# Patient Record
Sex: FEMALE | Race: BLACK OR AFRICAN AMERICAN | NOT HISPANIC OR LATINO | Employment: OTHER | URBAN - METROPOLITAN AREA
[De-identification: names, ages, dates, MRNs, and addresses within clinical notes are randomized per-mention and may not be internally consistent; named-entity substitution may affect disease eponyms.]

---

## 2024-01-26 PROBLEM — K31.89 SUBMUCOSAL LESION OF STOMACH: Status: ACTIVE | Noted: 2024-01-26

## 2024-02-20 ASSESSMENT — ENCOUNTER SYMPTOMS
ABDOMINAL PAIN: 0
HEADACHES: 1
STRIDOR: 0
SWOLLEN GLANDS: 1
COUGH: 0
VOMITING: 1
SORE THROAT: 1
NECK PAIN: 1
HOARSE VOICE: 0
DIARRHEA: 0
TROUBLE SWALLOWING: 1

## 2024-02-27 ENCOUNTER — OFFICE VISIT (OUTPATIENT)
Dept: OTOLARYNGOLOGY | Facility: CLINIC | Age: 64
End: 2024-02-27
Payer: COMMERCIAL

## 2024-02-27 VITALS — HEIGHT: 63 IN | WEIGHT: 193 LBS | BODY MASS INDEX: 34.2 KG/M2

## 2024-02-27 DIAGNOSIS — R22.1 NECK FULLNESS: ICD-10-CM

## 2024-02-27 DIAGNOSIS — R09.89 THROAT CLEARING: ICD-10-CM

## 2024-02-27 DIAGNOSIS — J35.1 TONSILLAR HYPERTROPHY: ICD-10-CM

## 2024-02-27 DIAGNOSIS — M54.2 NECK PAIN: ICD-10-CM

## 2024-02-27 DIAGNOSIS — E04.9 GOITER: ICD-10-CM

## 2024-02-27 DIAGNOSIS — K21.9 LARYNGOPHARYNGEAL REFLUX (LPR): ICD-10-CM

## 2024-02-27 DIAGNOSIS — M54.2 NECK DISCOMFORT: Primary | ICD-10-CM

## 2024-02-27 PROCEDURE — 1036F TOBACCO NON-USER: CPT | Performed by: STUDENT IN AN ORGANIZED HEALTH CARE EDUCATION/TRAINING PROGRAM

## 2024-02-27 PROCEDURE — 99204 OFFICE O/P NEW MOD 45 MIN: CPT | Performed by: STUDENT IN AN ORGANIZED HEALTH CARE EDUCATION/TRAINING PROGRAM

## 2024-02-27 PROCEDURE — 31575 DIAGNOSTIC LARYNGOSCOPY: CPT | Performed by: STUDENT IN AN ORGANIZED HEALTH CARE EDUCATION/TRAINING PROGRAM

## 2024-02-27 PROCEDURE — 1159F MED LIST DOCD IN RCRD: CPT | Performed by: STUDENT IN AN ORGANIZED HEALTH CARE EDUCATION/TRAINING PROGRAM

## 2024-02-27 RX ORDER — EPINEPHRINE 0.3 MG/.3ML
0.3 INJECTION SUBCUTANEOUS ONCE AS NEEDED
COMMUNITY
Start: 2016-05-03

## 2024-02-27 RX ORDER — PANTOPRAZOLE SODIUM 40 MG/1
40 TABLET, DELAYED RELEASE ORAL
Qty: 90 TABLET | Refills: 1 | Status: SHIPPED | OUTPATIENT
Start: 2024-02-27 | End: 2024-08-25

## 2024-02-27 RX ORDER — BIOTIN 800 MCG
TABLET ORAL
COMMUNITY

## 2024-02-27 RX ORDER — HYDROCORTISONE 5 MG/1
5 TABLET ORAL DAILY
COMMUNITY
Start: 2018-03-19

## 2024-02-27 RX ORDER — ALBUTEROL SULFATE 90 UG/1
2 AEROSOL, METERED RESPIRATORY (INHALATION)
COMMUNITY
Start: 2023-09-11

## 2024-02-27 RX ORDER — CIPROFLOXACIN HYDROCHLORIDE 3 MG/ML
1 SOLUTION/ DROPS OPHTHALMIC 4 TIMES DAILY
COMMUNITY
Start: 2023-07-10

## 2024-02-27 RX ORDER — CYANOCOBALAMIN/FOLIC ACID 1MG-400MCG
LOZENGE SUBLINGUAL
COMMUNITY
Start: 2023-01-01

## 2024-02-27 RX ORDER — LEVOTHYROXINE SODIUM 100 UG/1
100 TABLET ORAL
COMMUNITY
Start: 2023-09-20

## 2024-02-27 RX ORDER — PYRIDOXINE HCL (VITAMIN B6) 100 MG
100 TABLET ORAL DAILY
COMMUNITY
Start: 2023-01-01

## 2024-02-27 RX ORDER — CHOLECALCIFEROL (VITAMIN D3) 125 MCG
5000 CAPSULE ORAL
COMMUNITY

## 2024-02-27 RX ORDER — FLUTICASONE PROPIONATE 50 MCG
2 SPRAY, SUSPENSION (ML) NASAL
COMMUNITY
Start: 2023-09-01 | End: 2024-08-31

## 2024-02-27 NOTE — PATIENT INSTRUCTIONS
"REFLUX (GERD / LPR) INFORMATION SHEET    TYPES OF REFLUX:  *  Gastroesophageal Reflux (GERD):  Backflow of stomach contents (acid) into the esophagus     ·  Symptoms:  Heartburn, regurgitation, swallowing problems  *  Laryngopharyngeal (LPR):  Backflow of stomach contents into the voice box and throat     ·  Symptoms:  Hoarseness, nonproductive throat clearing, cough, swallowing problems, sensation of \"lump\" in the throat, sudden shortness of breath or choking sensation         (especially at night)      WHY DON'T I HAVE HEARTBURN?  Many patients with LPRD do not experience significant heartburn (65%).  Heartburn occurs when the tissue in the esophagus becomes irritated.  The lining in the larynx (voice box) and the upper throat does not have as strong a protective lining as the esophagus, therefore, it is more sensitive to stomach acid.     WHAT CAN I DO TO REDUCE REFLUX?  *   Reduce Stress:  Even a moderate amount of stress can dramatically increase the amount of reflux.    *   Diet:  Try to maintain a healthy body weight.  Eat sensible, moderate amounts.  Eat meals at least 3 hours before bedtime. Avoid bedtime snacks.  Certain foods have been         shown to cause reflux including:     ·  Spicy, acidic and greasy foods     ·  Tomato based foods (spaghetti sauce, Mexican foods, etc.)     ·  Acidic fruit and juices (orange, tomato, cranberry juice, grapefruit, etc.)     ·  Caffeine (tea, coffee, soda, chocolate)     ·  Alcohol     ·  Dairy products     ·  Peppermint    *   Bedtime:  Elevate the head of your bed with 4-6 inch blocks.  Do not elevate your head with extra pillows as this can worsen reflux.  If the entire head of the bed is tilted        upwards, gravity reduces the backflow of acid.   *   Clothing:  Avoid tight belts and other restrictive clothing  *   Smoking:  Avoid smoking and second-hand smoke as this dramatically increases reflux   *   Medications:  NSAIDS (ex. Advil/Motrin type meds), aspirin, " "steroids can aggravate reflux  *   Medical Therapy:     ·  H2 receptor antagonists - cimetidine, ranitidine, famotidine, etc.            o Absorption is reduced 10 to 20 percent by associated antacid administration (ex. Tums)            o Achieve less acid suppression than proton pump inhibitors     ·  Proton pump inhibitor - omeprazole, lansoprazole, pantoprazole, etc.            o PPI's most effective when taken 30 to 60 minutes before meals on an empty stomach            o PPI's can effect absorption of medications (Plavix, Coumadin, etc) so check with your pharmacist prior to initiating therapy for any interactions      ·  Alginates - Gaviscon and Gaviscon Advance              o Block the stomach lining by forming a physical barrier    Once on medical therapy for reflux it can take weeks or months for symptom improvement or resolution as underlying damaged tissue heals (think about a burn on your arm…will take a long time to completely heal).  Persistent symptoms despite appropriate, consistent medical therapy should prompt an evaluation by gastroenterology.        \"[facility] is pleased to meet your health care needs.  We strive to deliver the highest quality and most value based care possible.  Thank you for giving us the opportunity to serve you.\"    "

## 2024-02-27 NOTE — PROGRESS NOTES
HPI  Madison Asencio is a 63 y.o. female presenting for initial evaluation of multiple ENT complaints.  The patient endorses recurrent episodes of submandibular discomfort for several years,  Feels her submandibular discomfort is more noticeable on the right, endorses associated fullness and intermittent neck pain.  Develops 1-2 episodes of tonsillitis a year requiring antibiotic therapy.    Also reports history of goiter and dysphagia.  Has a history of GERD and esophageal stenosis, underwent dilation 2 years ago without significant improvement.    Had a CT neck performed January 7, 2024 (report reviewed, did not bring disc with her today) notable likely reactive lymphadenopathy, tonsillitis, tonsil hypertrophy and multinodular goiter with 3 cm nodule along the right inferior thyroid lobe/isthmus in addition to a left subcentimeter thyroid nodule.      History reviewed. No pertinent past medical history.    History reviewed. No pertinent surgical history.      Current Outpatient Medications on File Prior to Visit   Medication Sig Dispense Refill    albuterol 90 mcg/actuation inhaler Inhale 2 puffs 4 times a day.      ciprofloxacin (Ciloxan) 0.3 % ophthalmic solution Administer 1 drop into affected eye(s) 4 times a day.      cyanocobalamin/folic acid (vitamin B12-folic acid) 1,000-400 mcg lozenge       EPINEPHrine 0.3 mg/0.3 mL injection syringe Inject 0.3 mL (0.3 mg) into the muscle 1 time if needed.      fluticasone (Flonase) 50 mcg/actuation nasal spray Administer 2 sprays into each nostril once daily.      hydrocortisone (Cortef) 5 mg tablet Take 1 tablet (5 mg) by mouth once daily.      levothyroxine (Synthroid, Levoxyl) 100 mcg tablet Take 1 tablet (100 mcg) by mouth once daily.      pyridoxine (Vitamin B-6) 100 mg tablet Take 1 tablet (100 mg) by mouth once daily.      biotin 800 mcg tablet Take by mouth.      cholecalciferol (Vitamin D-3) 125 MCG (5000 UT) capsule Take 1 capsule (125 mcg) by mouth once  daily.       No current facility-administered medications on file prior to visit.        Allergies   Allergen Reactions    Azithromycin Hives     Red dye?    Red dye?   Red dye?    Cayenne Pepper Fruits Anaphylaxis    Black Pepper Angioedema    Budesonide-Formoterol Unknown    Cinnamon Angioedema    Hydrocortisone Unknown    Peanut Oil Angioedema     But states can eat peanuts and peanut butter    Penicillins Swelling    Phenobarbital Unknown, Hallucinations and Other     lethargic    Phenytoin Unknown and Other     lethargy    Red Dye Other and Unknown    Statins-Hmg-Coa Reductase Inhibitors Unknown     Liver failure    Tomato Angioedema     Cooked tomatoes    Codeine Rash, Anxiety, Unknown and Swelling     hyper    Dextromethorphan-Guaifenesin Other and Unknown    Fenofibrate Itching, Unknown, Rash and Swelling    Latex Rash        Review of Systems  A detailed 12 point ROS was performed and is negative except as noted in the intake form, HPI and/or Past Medical History        Physical Exam   CONSTITUTIONAL: Well developed, well nourished.  VOICE: Normal voice quality  RESPIRATION: Breathing comfortably, no stridor.  CV: No clubbing/cyanosis/edema in hands.  EYES: EOM Intact, sclera normal.  NEURO: Alert and oriented times 3, Cranial nerves V,VII intact and symmetric bilaterally.  HEAD AND FACE: Symmetric facial features, no masses or lesions, sinuses nontender to palpation.  SALIVARY GLANDS: Parotid and submandibular glands normal bilaterally.  EARS: Normal external ears, external auditory canals, and TMs to otoscopy  NOSE: External nose midline, anterior rhinoscopy is normal with limited visualization to the anterior aspect of the interior turbinates. No lesions noted.  + ORAL CAVITY/OROPHARYNX/LIPS: Normal mucous membranes, normal floor of mouth/tongue/OP, no masses or lesions are noted.  Tonsils 3+, cryptic, symmetric  PHARYNGEAL WALLS AND NASOPHARYNX: No masses noted. Mucosa appears clean and  moist  NECK/LYMPH: No LAD, no thyroid masses. Trachea palpably midline  SKIN: Neck skin is without injury  PSYCH: Alert and oriented with appropriate mood and affect     PROCEDURE NOTE:  FLEXIBLE NASOLARYNGOSCOPY     The risks, benefits, and alternatives were explained.  The patient wished to proceed and provided verbal consent.       INDICATIONS: To visualize anatomy in specific detail; evaluation of symptomatic disorder involving the voice, swallowing, or upper aerodigestive tract, including DMITRIY.      DESCRIPTION OF PROCEDURE: After adequate afrin and lidocaine spray, I advanced the endoscope into the nasal cavity.  The nasal cavity, nasopharynx, tongue base, vallecula, posterior/lateral pharyngeal walls, pyriform, epiglottis, post cricoid area, and larynx were within normal limits.  The following specific findings should be noted:  No lesions/masses  Mobile TVCs bilaterally  No pooling of secretions  Moderate postcricoid edema     The patient tolerated the procedure without difficulty.     Assessment  Submandibular neck discomfort  Laryngopharyngeal reflux  Multinodular goiter  Tonsillar hypertrophy  History of esophageal stenosis      Plan  63-year-old female presenting for evaluation of recurrent episodes of submandibular neck discomfort in the setting of reflux.  Recently had a CT scan performed through [facility] notable for tonsillar hypertrophy, reactive lymphadenopathy and multinodular goiter.  No lesions/masses.  Endorses 1 to 2 episode of tonsillitis a year.  It is unclear if removal of her tonsils at this time will provide significant benefit.    Also has a history of reflux which is not well-controlled and esophageal stenosis s/p dilation.  FNP notable for significant LPR changes.  Dietary modifications for reflux control and pantoprazole trial prescribed.  The patient was advised to follow-up with her gastroenterologist.  Her main complaint today is submandibular and anterior neck discomfort, she was  referred to head and neck surgery for further evaluation.  She was advised to retrieve disc of her CT neck.   We will monitor her response to PPI   Fu via telehealth in 6w

## 2024-04-02 NOTE — PROGRESS NOTES
Chief Complaint   Patient presents with    Neck discomfort     HPI  Madison Asencio is a 64 y.o. female referred to me today by Mc Mo MD for further evaluation and treatment of multiple ENT complaints. The patient endorses recurrent episodes of submandibular discomfort for several years, Feels her submandibular discomfort is more noticeable on the right, endorses associated fullness and intermittent neck pain. She reports she has been having issue with dysphagia. She is having to take smaller bites and chew things up very well before swallowing. She reports she does have bad allergies all year round. She has not been seen by her allergist recently. She has been following with neurology and endocrinology regarding her facial swelling. She reports there is swelling under her chin and in her anterior neck that hangs down. Patient has been seen by Dr. Myles and had a CT neck performed 1/7/24 which I personally review. +reactive lymphadenopathy, tonsillitis, tonsil hypertrophy and multinodular goiter with 3 cm nodule along the right inferior thyroid lobe/isthmus in addition to a left subcentimeter thyroid nodule. Images requested. She also has a history of GERD and esophageal stenosis, for which she underwent dilation 2 years ago.     Her main concern for her visit with me today is her facial and neck swelling.  She has photos of the swelling which will involve the lower face and level IB of her neck but there are times when it will extend up towards her eye.       PMH  History reviewed. No pertinent past medical history.     PSH  History reviewed. No pertinent surgical history.     ROS  Review of Systems   Constitutional:  Negative for appetite change, chills, fatigue, fever and unexpected weight change.   HENT:  Negative for dental problem, drooling, ear pain, facial swelling, hearing loss, mouth sores, sore throat, tinnitus, trouble swallowing and voice change.    Respiratory:  Negative for cough,  shortness of breath and stridor.    Gastrointestinal:  Negative for nausea and vomiting.   Musculoskeletal:  Negative for neck pain.   Hematological:  Negative for adenopathy.   All other systems reviewed and are negative.       PE  ENT Physical Exam  Constitutional  Appearance: patient appears well-developed,  Communication/Voice: communication appropriate for developmental age;  Head and Face  Appearance: head appears normal and face appears normal;  Salivary: glands normal;  Ear  Auricles: right auricle normal; left auricle normal;  Ear Canals: right ear canal normal; left ear canal normal;  Tympanic Membranes: right tympanic membrane normal; left tympanic membrane normal;  Nose  Internal Nose: nasal mucosa normal; septum normal;  Oral Cavity/Oropharynx  Gums: gingiva normal;  Tongue: normal;  Oral mucosa: normal;  Neck  Neck: normal trachea;  Thyroid: nodules present on right lobe; enlargement present on bilateral lobes;  Neck comments: No lymphadenopathy  Respiratory  Inspection: breathing unlabored;  Cardiovascular  Inspection: extremities are warm and well perfused;  Neurovestibular  Mental Status: alert and oriented;  Psychiatric: mood normal; affect is appropriate;  Cranial Nerves: cranial nerves intact;       Procedures     ASSESSMENT AND PLAN  Problem List Items Addressed This Visit       Neck fullness    Relevant Orders    Referral to Allergy    Referral to Physical Therapy    Neck pain    Angio-edema    Current Assessment & Plan     She has photos of the atypical facial swelling that she will develop and based on her history and the appearance in the photos this looks like angioedema.  She has been diagnosed with angioedema previously.  I recommend that she discuss her diagnosis of angioedema with her neurologist and her allergist.  Recommend lymphedema therapy for massage and treatment  She has not had involvement of her airway or needed to be intubated for this         Relevant Orders    Referral to  Allergy    Referral to Physical Therapy    Goiter    Current Assessment & Plan     Reviewed her thyroid US - she is being followed for this             Jacqueline Hobbs MD    Head & Neck Surgical Oncology & Reconstruction  Department of Otolaryngology - Head and Neck Surgery     By signing my name below, I, Josafat Ramseye, attest that this documentation has been prepared under the direction and in the presence of Dr. Jacqueline Hobbs MD.     All medical record entries made by the Scribe were at my direction and personally dictated by me, Dr. Jacqueline Hobbs. I have reviewed the chart and agree that the record accurately reflects my personal performance of the history, physical exam, discussion and plan.

## 2024-04-09 ENCOUNTER — APPOINTMENT (OUTPATIENT)
Dept: OTOLARYNGOLOGY | Facility: CLINIC | Age: 64
End: 2024-04-09
Payer: COMMERCIAL

## 2024-04-15 ENCOUNTER — OFFICE VISIT (OUTPATIENT)
Dept: OTOLARYNGOLOGY | Facility: CLINIC | Age: 64
End: 2024-04-15
Payer: COMMERCIAL

## 2024-04-15 VITALS — WEIGHT: 196 LBS | BODY MASS INDEX: 34.72 KG/M2

## 2024-04-15 DIAGNOSIS — E04.9 GOITER: ICD-10-CM

## 2024-04-15 DIAGNOSIS — R22.1 NECK FULLNESS: ICD-10-CM

## 2024-04-15 DIAGNOSIS — M54.2 NECK PAIN: ICD-10-CM

## 2024-04-15 DIAGNOSIS — T78.3XXD ANGIOEDEMA, SUBSEQUENT ENCOUNTER: ICD-10-CM

## 2024-04-15 PROCEDURE — 99204 OFFICE O/P NEW MOD 45 MIN: CPT | Performed by: OTOLARYNGOLOGY

## 2024-04-15 PROCEDURE — 1159F MED LIST DOCD IN RCRD: CPT | Performed by: OTOLARYNGOLOGY

## 2024-04-15 PROCEDURE — 1160F RVW MEDS BY RX/DR IN RCRD: CPT | Performed by: OTOLARYNGOLOGY

## 2024-04-15 RX ORDER — RIZATRIPTAN BENZOATE 10 MG/1
10 TABLET ORAL ONCE AS NEEDED
COMMUNITY

## 2024-04-15 ASSESSMENT — ENCOUNTER SYMPTOMS
NAUSEA: 0
ADENOPATHY: 0
TROUBLE SWALLOWING: 0
NECK PAIN: 0
FACIAL SWELLING: 0
STRIDOR: 0
APPETITE CHANGE: 0
SORE THROAT: 0
UNEXPECTED WEIGHT CHANGE: 0
SHORTNESS OF BREATH: 0
VOMITING: 0
FEVER: 0
COUGH: 0
CHILLS: 0
FATIGUE: 0
VOICE CHANGE: 0

## 2024-04-15 ASSESSMENT — PATIENT HEALTH QUESTIONNAIRE - PHQ9
SUM OF ALL RESPONSES TO PHQ9 QUESTIONS 1 AND 2: 0
1. LITTLE INTEREST OR PLEASURE IN DOING THINGS: NOT AT ALL
2. FEELING DOWN, DEPRESSED OR HOPELESS: NOT AT ALL

## 2024-04-15 NOTE — PATIENT INSTRUCTIONS
Dear Madison Asencio    Welcome to Dr. [facility],    Dr. Hobbs is a Head and neck oncologist and reconstructive surgeon. This means that she specializes in caring for patients with complex head and neck problems such as cancer, however,you may be seeing her for another reason.      Dr. Hobbs's office number is [phone] option #2.  While you may see her at a satellite office, she has a team committed to help meet your healthcare needs at [facility].  This number listed, is the most direct way to communicate with her office.      Dr. Hobbs's  answers the office phone from 8am-4pm Monday-Friday.  She can help you with many general questions and information.  Questions that she may not be able to answer will be directed to the appropriate staff.  You will need to leave a message and someone from the team will call you back.     Damari and Lali are Dr. Hobbs's primary nurses. They can both be reached by calling the office as well. Damari is in clinic on Mondays and Wednesdays with Dr. Hobbs. Damari is out of the office on Tuesday's and Friday's, but Lali is in the office on Tuesdays and Fridays and will be covering all patient concerns. Please be mindful that all non urgent calls will be returned within 24 hours of the call.     Sometimes, other team members will also be involved in your care.  These people may include dieticians, social workers, speech therapists, medical oncologists, or radiation oncologists.  Dr. Hobbs will provide these referrals for you as needed.      For your connivence, Dr. Hobbs sees patients at several [facility] locations. These locations are [facility], [facility], and [facility] [place] at the [facility].  While we try to make your appointment as convenient as possible, occasionally a visit to another location may be necessary to provide you with the best care.      We look  forward to working with you to meet your healthcare goals.    Dr. Hobbs evaluated you today.    Your care plan is outlined below:  -- Dr. Hobbs recommends you see the allergist  -- See the lymph edema therapist.     General appointment line please call [phone]  For general questions or scheduling issues please call [phone] option #2   For medical questions or surgery scheduling please call [phone] on Mondays, Wednesdays and Thursdays or [phone] on Tuesdays and Fridays. Please be sure to leave a voice mail or your call will not be able to be returned.     Dr. Hobbs makes every effort to run on time for your appointments.  Therefore, if you are more than 30 minutes late for your appointment, unrelated to a scan or another appointment such as chemotherapy or radiation, your appointment will need to be rescheduled to another day.  We appreciate your understanding.

## 2024-04-23 ENCOUNTER — TELEMEDICINE (OUTPATIENT)
Dept: OTOLARYNGOLOGY | Facility: CLINIC | Age: 64
End: 2024-04-23
Payer: COMMERCIAL

## 2024-04-23 DIAGNOSIS — M54.2 NECK PAIN: ICD-10-CM

## 2024-04-23 DIAGNOSIS — K21.9 LARYNGOPHARYNGEAL REFLUX (LPR): ICD-10-CM

## 2024-04-23 DIAGNOSIS — M54.2 NECK DISCOMFORT: ICD-10-CM

## 2024-04-23 DIAGNOSIS — R22.1 NECK FULLNESS: Primary | ICD-10-CM

## 2024-04-23 DIAGNOSIS — J35.1 TONSILLAR HYPERTROPHY: ICD-10-CM

## 2024-04-23 DIAGNOSIS — R09.89 THROAT CLEARING: ICD-10-CM

## 2024-04-23 PROCEDURE — 1159F MED LIST DOCD IN RCRD: CPT | Performed by: STUDENT IN AN ORGANIZED HEALTH CARE EDUCATION/TRAINING PROGRAM

## 2024-04-23 PROCEDURE — 1160F RVW MEDS BY RX/DR IN RCRD: CPT | Performed by: STUDENT IN AN ORGANIZED HEALTH CARE EDUCATION/TRAINING PROGRAM

## 2024-04-23 PROCEDURE — 99213 OFFICE O/P EST LOW 20 MIN: CPT | Performed by: STUDENT IN AN ORGANIZED HEALTH CARE EDUCATION/TRAINING PROGRAM

## 2024-04-23 NOTE — PROGRESS NOTES
HPI  4/23/24 Telehealth visit  The patient reports improvement of her reflux/throat clearing with pantoprazole use and dietary modifications.  Gastroenterology appointment pending.  Endorses occasional episodes of submandibular discomfort, undergoing evaluation by H&N.   Recall   Madison Asencio is a 64 y.o. female presenting for initial evaluation of multiple ENT complaints.  The patient endorses recurrent episodes of submandibular discomfort for several years,  Feels her submandibular discomfort is more noticeable on the right, endorses associated fullness and intermittent neck pain.  Develops 1-2 episodes of tonsillitis a year requiring antibiotic therapy.    Also reports history of goiter and dysphagia.  Has a history of GERD and esophageal stenosis, underwent dilation 2 years ago without significant improvement.    Had a CT neck performed January 7, 2024 (report reviewed, did not bring disc with her today) notable likely reactive lymphadenopathy, tonsillitis, tonsil hypertrophy and multinodular goiter with 3 cm nodule along the right inferior thyroid lobe/isthmus in addition to a left subcentimeter thyroid nodule.      No past medical history on file.    No past surgical history on file.      Current Outpatient Medications on File Prior to Visit   Medication Sig Dispense Refill    albuterol 90 mcg/actuation inhaler Inhale 2 puffs 4 times a day.      atogepant (QULIPTA ORAL) Take by mouth.      biotin 800 mcg tablet Take by mouth.      cholecalciferol (Vitamin D-3) 125 MCG (5000 UT) capsule Take 1 capsule (125 mcg) by mouth once daily.      ciprofloxacin (Ciloxan) 0.3 % ophthalmic solution Administer 1 drop into affected eye(s) 4 times a day.      cyanocobalamin/folic acid (vitamin B12-folic acid) 1,000-400 mcg lozenge       EPINEPHrine 0.3 mg/0.3 mL injection syringe Inject 0.3 mL (0.3 mg) into the muscle 1 time if needed.      fluticasone (Flonase) 50 mcg/actuation nasal spray Administer 2 sprays into each  nostril once daily.      hydrocortisone (Cortef) 5 mg tablet Take 1 tablet (5 mg) by mouth once daily.      levothyroxine (Synthroid, Levoxyl) 100 mcg tablet Take 1 tablet (100 mcg) by mouth once daily.      pantoprazole (ProtoNix) 40 mg EC tablet Take 1 tablet (40 mg) by mouth once daily in the morning. Take before meals. Do not crush, chew, or split. 90 tablet 1    pyridoxine (Vitamin B-6) 100 mg tablet Take 1 tablet (100 mg) by mouth once daily.      rizatriptan (Maxalt) 10 mg tablet Take 1 tablet (10 mg) by mouth 1 time if needed for migraine. May repeat in 2 hours if unresolved. Do not exceed 30 mg in 24 hours.       No current facility-administered medications on file prior to visit.        Allergies   Allergen Reactions    Azithromycin Hives     Red dye?    Red dye?   Red dye?    Cayenne Pepper Fruits Anaphylaxis    Black Pepper Angioedema    Budesonide-Formoterol Unknown    Cinnamon Angioedema    Hydrocortisone Unknown    Other Nausea/vomiting     Burning sensation in entire face after application of topical lidocaine and afrin into nasal cavity. Unsure of which agent caused the reaction.    Peanut Oil Angioedema     But states can eat peanuts and peanut butter    Penicillins Swelling    Phenobarbital Unknown, Hallucinations and Other     lethargic    Phenytoin Unknown and Other     lethargy    Red Dye Other and Unknown    Statins-Hmg-Coa Reductase Inhibitors Unknown     Liver failure    Tomato Angioedema     Cooked tomatoes    Codeine Rash, Anxiety, Unknown and Swelling     hyper    Dextromethorphan-Guaifenesin Other and Unknown    Fenofibrate Itching, Unknown, Rash and Swelling    Latex Rash              Assessment  Submandibular neck discomfort  Laryngopharyngeal reflux  Multinodular goiter  Tonsillar hypertrophy  History of esophageal stenosis      Plan  63-year-old female presenting for evaluation of recurrent episodes of submandibular neck discomfort in the setting of reflux.  Recently had a CT scan  performed through [facility] notable for tonsillar hypertrophy, reactive lymphadenopathy and multinodular goiter (followed by endocrinology).  No lesions/masses.  Endorses 1 to 2 episode of tonsillitis a year.  It is unclear if removal of her tonsils at this time will provide significant benefit.    Reports improvement of her reflux symptomatology with pantoprazole.    Has a history of esophageal stenosis s/p dilation.  FNP notable for significant LPR changes.  Dietary modifications for reflux control discussed.  The patient was advised to follow-up with her gastroenterologist.  Undergoing evaluation by H&N for submandibular discomfort.   RTC PRN           An interactive audio and/or video telecommunication system which permits real time communications between the patient (at the originating site) and provider (at the distant site) was utilized to provide this telehealth service.    Verbal consent was requested and obtained from the patient for a telehealth visit.  Time spent talking with patient: 25 minutes. Greater than 50% of that was review of health conditions, health promotion, risk reducing discussion.

## 2024-04-29 PROBLEM — T78.3XXA ANGIO-EDEMA: Status: ACTIVE | Noted: 2024-04-29

## 2024-04-29 PROBLEM — E04.9 GOITER: Status: ACTIVE | Noted: 2024-04-29

## 2024-04-29 PROBLEM — R22.1 NECK FULLNESS: Status: ACTIVE | Noted: 2024-04-29

## 2024-04-29 PROBLEM — M54.2 NECK PAIN: Status: ACTIVE | Noted: 2024-04-29

## 2024-04-30 NOTE — ASSESSMENT & PLAN NOTE
She has photos of the atypical facial swelling that she will develop and based on her history and the appearance in the photos this looks like angioedema.  She has been diagnosed with angioedema previously.  I recommend that she discuss her diagnosis of angioedema with her neurologist and her allergist.  Recommend lymphedema therapy for massage and treatment  She has not had involvement of her airway or needed to be intubated for this

## 2024-08-09 ASSESSMENT — ENCOUNTER SYMPTOMS
VOMITING: 0
STRIDOR: 0
SORE THROAT: 0
FACIAL SWELLING: 0
TROUBLE SWALLOWING: 0
COUGH: 0
VOICE CHANGE: 0
FEVER: 0
ADENOPATHY: 0
UNEXPECTED WEIGHT CHANGE: 0
NAUSEA: 0
NECK PAIN: 0
APPETITE CHANGE: 0
FATIGUE: 0
SHORTNESS OF BREATH: 0
CHILLS: 0

## 2024-08-09 NOTE — PROGRESS NOTES
Chief Complaint   Patient presents with    Follow-up     Virtual visit discussion     HPI  Madison Asencio is a 64 y.o. female following up with me virtually today regarding her facial/neck swelling.  Last seen 4/24 for submandibular swelling. She states she has done all of the testing that was requested of her and would like to discuss surgical options at this time. Images of her most recent scans have been requested.  Please see her last note for full details but at that time I was concerned that she had atypical angioedema with the episodic facial/neck swelling and photos she presented.  She has since seen allergy 7/8/24 and Dr. Riley did not feel her presentation was consistent with angioedema.  She is concerned that her facial edema will return and is interested in any further treatment that can prevent this.  In addition she had MBS and esophagram which she passed 7/24.  No recent infections or URI symptoms.  Weight is stable.      ROS  Review of Systems   Constitutional:  Negative for appetite change, chills, fatigue, fever and unexpected weight change.   HENT:  Negative for dental problem, drooling, ear pain, facial swelling, hearing loss, mouth sores, sore throat, tinnitus, trouble swallowing and voice change.    Respiratory:  Negative for cough, shortness of breath and stridor.    Gastrointestinal:  Negative for nausea and vomiting.   Musculoskeletal:  Negative for neck pain.   Hematological:  Negative for adenopathy.   All other systems reviewed and are negative.       PE - phone visit    Procedures     ASSESSMENT AND PLAN  Problem List Items Addressed This Visit       Neck fullness - Primary    Current Assessment & Plan     Episodic facial/neck edema is not consistent with any glandular infection such as sialadenitis.  Rather this is more consistent with either lymphatic or atypical angioedema although allergist did not agree.  Reviewed that there is no surgical treatment that can be provided to help  with fluid drainage in the head/neck.  Can sleep with HOB elevated and can continue to treat allergies.  Follow up as needed             Jacqueline Hobbs MD    Head & Neck Surgical Oncology & Reconstruction  Department of Otolaryngology - Head and Neck Surgery     By signing my name below, I, Raymon Ramsey, attest that this documentation has been prepared under the direction and in the presence of Dr. Jacqueline Hobbs MD.     All medical record entries made by the Eugenioibe were at my direction and personally dictated by me, Dr. Jacqueline Hobbs. I have reviewed the chart and agree that the record accurately reflects my personal performance of the history, physical exam, discussion and plan.

## 2024-08-14 ENCOUNTER — TELEMEDICINE (OUTPATIENT)
Dept: OTOLARYNGOLOGY | Facility: HOSPITAL | Age: 64
End: 2024-08-14
Payer: COMMERCIAL

## 2024-08-14 DIAGNOSIS — R22.1 NECK FULLNESS: Primary | ICD-10-CM

## 2024-08-14 PROCEDURE — 1036F TOBACCO NON-USER: CPT | Performed by: OTOLARYNGOLOGY

## 2024-08-14 PROCEDURE — 99213 OFFICE O/P EST LOW 20 MIN: CPT | Performed by: OTOLARYNGOLOGY

## 2024-08-14 PROCEDURE — 1160F RVW MEDS BY RX/DR IN RCRD: CPT | Performed by: OTOLARYNGOLOGY

## 2024-08-14 PROCEDURE — 1159F MED LIST DOCD IN RCRD: CPT | Performed by: OTOLARYNGOLOGY

## 2024-08-14 RX ORDER — ATOGEPANT 60 MG/1
TABLET ORAL
COMMUNITY
Start: 2024-08-01

## 2024-08-14 RX ORDER — CYCLOBENZAPRINE HCL 5 MG
5 TABLET ORAL AS NEEDED
COMMUNITY
Start: 2024-05-23

## 2024-08-14 ASSESSMENT — PATIENT HEALTH QUESTIONNAIRE - PHQ9
1. LITTLE INTEREST OR PLEASURE IN DOING THINGS: NOT AT ALL
SUM OF ALL RESPONSES TO PHQ9 QUESTIONS 1 & 2: 0
2. FEELING DOWN, DEPRESSED OR HOPELESS: NOT AT ALL

## 2024-10-11 DIAGNOSIS — K21.9 LARYNGOPHARYNGEAL REFLUX (LPR): ICD-10-CM

## 2024-10-11 RX ORDER — PANTOPRAZOLE SODIUM 40 MG/1
40 TABLET, DELAYED RELEASE ORAL
Qty: 90 TABLET | Refills: 0 | Status: SHIPPED | OUTPATIENT
Start: 2024-10-11

## 2025-04-24 DIAGNOSIS — K21.9 LARYNGOPHARYNGEAL REFLUX (LPR): ICD-10-CM

## 2025-04-29 RX ORDER — PANTOPRAZOLE SODIUM 40 MG/1
TABLET, DELAYED RELEASE ORAL
Qty: 90 TABLET | Refills: 0 | Status: SHIPPED | OUTPATIENT
Start: 2025-04-29